# Patient Record
Sex: FEMALE | Race: OTHER | Employment: FULL TIME | URBAN - METROPOLITAN AREA
[De-identification: names, ages, dates, MRNs, and addresses within clinical notes are randomized per-mention and may not be internally consistent; named-entity substitution may affect disease eponyms.]

---

## 2022-11-15 PROBLEM — R20.0 RIGHT FACIAL NUMBNESS: Status: ACTIVE | Noted: 2022-11-15

## 2022-11-15 PROBLEM — I63.9 CEREBROVASCULAR ACCIDENT (CVA), UNSPECIFIED MECHANISM (HCC): Status: ACTIVE | Noted: 2022-11-15

## 2023-08-10 ENCOUNTER — APPOINTMENT (OUTPATIENT)
Dept: GENERAL RADIOLOGY | Age: 63
End: 2023-08-10
Payer: COMMERCIAL

## 2023-08-10 ENCOUNTER — HOSPITAL ENCOUNTER (OUTPATIENT)
Age: 63
Setting detail: OBSERVATION
Discharge: HOME | End: 2023-08-11
Attending: EMERGENCY MEDICINE | Admitting: STUDENT IN AN ORGANIZED HEALTH CARE EDUCATION/TRAINING PROGRAM
Payer: COMMERCIAL

## 2023-08-10 DIAGNOSIS — R07.9 CHEST PAIN, UNSPECIFIED TYPE: Primary | ICD-10-CM

## 2023-08-10 DIAGNOSIS — E16.2 HYPOGLYCEMIA: ICD-10-CM

## 2023-08-10 LAB
ALBUMIN SERPL-MCNC: 4.3 G/DL (ref 3.4–5)
ALBUMIN/GLOB SERPL: 1.4 {RATIO} (ref 1.1–2.2)
ALP SERPL-CCNC: 168 U/L (ref 40–129)
ALT SERPL-CCNC: 43 U/L (ref 10–40)
ANION GAP SERPL CALCULATED.3IONS-SCNC: 11 MMOL/L (ref 3–16)
AST SERPL-CCNC: 33 U/L (ref 15–37)
BACTERIA URNS QL MICRO: ABNORMAL /HPF
BASOPHILS # BLD: 0 K/UL (ref 0–0.2)
BASOPHILS NFR BLD: 1.2 %
BILIRUB SERPL-MCNC: 0.5 MG/DL (ref 0–1)
BILIRUB UR QL STRIP.AUTO: NEGATIVE
BUN SERPL-MCNC: 7 MG/DL (ref 7–20)
CALCIUM SERPL-MCNC: 9.7 MG/DL (ref 8.3–10.6)
CHLORIDE SERPL-SCNC: 105 MMOL/L (ref 99–110)
CLARITY UR: CLEAR
CO2 SERPL-SCNC: 26 MMOL/L (ref 21–32)
COLOR UR: YELLOW
CREAT SERPL-MCNC: 0.7 MG/DL (ref 0.6–1.2)
DEPRECATED RDW RBC AUTO: 13.2 % (ref 12.4–15.4)
EKG ATRIAL RATE: 63 BPM
EKG ATRIAL RATE: 65 BPM
EKG ATRIAL RATE: 68 BPM
EKG DIAGNOSIS: NORMAL
EKG P AXIS: 31 DEGREES
EKG P AXIS: 34 DEGREES
EKG P AXIS: 43 DEGREES
EKG P-R INTERVAL: 160 MS
EKG P-R INTERVAL: 160 MS
EKG P-R INTERVAL: 174 MS
EKG Q-T INTERVAL: 430 MS
EKG Q-T INTERVAL: 432 MS
EKG Q-T INTERVAL: 444 MS
EKG QRS DURATION: 90 MS
EKG QRS DURATION: 94 MS
EKG QRS DURATION: 96 MS
EKG QTC CALCULATION (BAZETT): 449 MS
EKG QTC CALCULATION (BAZETT): 454 MS
EKG QTC CALCULATION (BAZETT): 457 MS
EKG R AXIS: -12 DEGREES
EKG R AXIS: -13 DEGREES
EKG R AXIS: 1 DEGREES
EKG T AXIS: 14 DEGREES
EKG T AXIS: 15 DEGREES
EKG T AXIS: 21 DEGREES
EKG VENTRICULAR RATE: 63 BPM
EKG VENTRICULAR RATE: 65 BPM
EKG VENTRICULAR RATE: 68 BPM
EOSINOPHIL # BLD: 0.3 K/UL (ref 0–0.6)
EOSINOPHIL NFR BLD: 7.2 %
GFR SERPLBLD CREATININE-BSD FMLA CKD-EPI: >60 ML/MIN/{1.73_M2}
GLUCOSE BLD-MCNC: 101 MG/DL (ref 70–99)
GLUCOSE BLD-MCNC: 119 MG/DL (ref 70–99)
GLUCOSE BLD-MCNC: 120 MG/DL (ref 70–99)
GLUCOSE SERPL-MCNC: 123 MG/DL (ref 70–99)
GLUCOSE UR STRIP.AUTO-MCNC: NEGATIVE MG/DL
HCT VFR BLD AUTO: 41.9 % (ref 36–48)
HGB BLD-MCNC: 14.2 G/DL (ref 12–16)
HGB UR QL STRIP.AUTO: NEGATIVE
KETONES UR STRIP.AUTO-MCNC: NEGATIVE MG/DL
LEUKOCYTE ESTERASE UR QL STRIP.AUTO: NEGATIVE
LYMPHOCYTES # BLD: 1.6 K/UL (ref 1–5.1)
LYMPHOCYTES NFR BLD: 39.4 %
MCH RBC QN AUTO: 29.3 PG (ref 26–34)
MCHC RBC AUTO-ENTMCNC: 33.8 G/DL (ref 31–36)
MCV RBC AUTO: 86.6 FL (ref 80–100)
MONOCYTES # BLD: 0.5 K/UL (ref 0–1.3)
MONOCYTES NFR BLD: 11.2 %
NEUTROPHILS # BLD: 1.7 K/UL (ref 1.7–7.7)
NEUTROPHILS NFR BLD: 41 %
NITRITE UR QL STRIP.AUTO: POSITIVE
NT-PROBNP SERPL-MCNC: 87 PG/ML (ref 0–124)
PERFORMED ON: ABNORMAL
PH UR STRIP.AUTO: 7.5 [PH] (ref 5–8)
PLATELET # BLD AUTO: 178 K/UL (ref 135–450)
PMV BLD AUTO: 9 FL (ref 5–10.5)
POTASSIUM SERPL-SCNC: 4.2 MMOL/L (ref 3.5–5.1)
PROT SERPL-MCNC: 7.4 G/DL (ref 6.4–8.2)
PROT UR STRIP.AUTO-MCNC: NEGATIVE MG/DL
RBC # BLD AUTO: 4.84 M/UL (ref 4–5.2)
RBC #/AREA URNS HPF: ABNORMAL /HPF (ref 0–4)
SODIUM SERPL-SCNC: 142 MMOL/L (ref 136–145)
SP GR UR STRIP.AUTO: 1.01 (ref 1–1.03)
TROPONIN, HIGH SENSITIVITY: 7 NG/L (ref 0–14)
TROPONIN, HIGH SENSITIVITY: <6 NG/L (ref 0–14)
UA DIPSTICK W REFLEX MICRO PNL UR: YES
URN SPEC COLLECT METH UR: ABNORMAL
UROBILINOGEN UR STRIP-ACNC: 0.2 E.U./DL
WBC # BLD AUTO: 4.1 K/UL (ref 4–11)
WBC #/AREA URNS HPF: ABNORMAL /HPF (ref 0–5)

## 2023-08-10 PROCEDURE — 99285 EMERGENCY DEPT VISIT HI MDM: CPT

## 2023-08-10 PROCEDURE — 99221 1ST HOSP IP/OBS SF/LOW 40: CPT | Performed by: INTERNAL MEDICINE

## 2023-08-10 PROCEDURE — 81001 URINALYSIS AUTO W/SCOPE: CPT

## 2023-08-10 PROCEDURE — 93005 ELECTROCARDIOGRAM TRACING: CPT | Performed by: STUDENT IN AN ORGANIZED HEALTH CARE EDUCATION/TRAINING PROGRAM

## 2023-08-10 PROCEDURE — G0378 HOSPITAL OBSERVATION PER HR: HCPCS

## 2023-08-10 PROCEDURE — 6370000000 HC RX 637 (ALT 250 FOR IP): Performed by: STUDENT IN AN ORGANIZED HEALTH CARE EDUCATION/TRAINING PROGRAM

## 2023-08-10 PROCEDURE — 80053 COMPREHEN METABOLIC PANEL: CPT

## 2023-08-10 PROCEDURE — 6360000002 HC RX W HCPCS: Performed by: EMERGENCY MEDICINE

## 2023-08-10 PROCEDURE — 71046 X-RAY EXAM CHEST 2 VIEWS: CPT

## 2023-08-10 PROCEDURE — 96365 THER/PROPH/DIAG IV INF INIT: CPT

## 2023-08-10 PROCEDURE — 84484 ASSAY OF TROPONIN QUANT: CPT

## 2023-08-10 PROCEDURE — 36415 COLL VENOUS BLD VENIPUNCTURE: CPT

## 2023-08-10 PROCEDURE — 96375 TX/PRO/DX INJ NEW DRUG ADDON: CPT

## 2023-08-10 PROCEDURE — 85025 COMPLETE CBC W/AUTO DIFF WBC: CPT

## 2023-08-10 PROCEDURE — 96374 THER/PROPH/DIAG INJ IV PUSH: CPT

## 2023-08-10 PROCEDURE — 96372 THER/PROPH/DIAG INJ SC/IM: CPT

## 2023-08-10 PROCEDURE — 83880 ASSAY OF NATRIURETIC PEPTIDE: CPT

## 2023-08-10 PROCEDURE — 93005 ELECTROCARDIOGRAM TRACING: CPT | Performed by: EMERGENCY MEDICINE

## 2023-08-10 PROCEDURE — 2580000003 HC RX 258: Performed by: EMERGENCY MEDICINE

## 2023-08-10 PROCEDURE — 6360000002 HC RX W HCPCS: Performed by: STUDENT IN AN ORGANIZED HEALTH CARE EDUCATION/TRAINING PROGRAM

## 2023-08-10 RX ORDER — HYDROXYZINE PAMOATE 25 MG/1
25 CAPSULE ORAL NIGHTLY PRN
COMMUNITY

## 2023-08-10 RX ORDER — MORPHINE SULFATE 10 MG/ML
6 INJECTION, SOLUTION INTRAMUSCULAR; INTRAVENOUS ONCE
Status: COMPLETED | OUTPATIENT
Start: 2023-08-10 | End: 2023-08-10

## 2023-08-10 RX ORDER — MORPHINE SULFATE 4 MG/ML
4 INJECTION INTRAVENOUS ONCE
Status: DISCONTINUED | OUTPATIENT
Start: 2023-08-10 | End: 2023-08-10

## 2023-08-10 RX ORDER — SODIUM CHLORIDE 0.9 % (FLUSH) 0.9 %
5-40 SYRINGE (ML) INJECTION PRN
Status: DISCONTINUED | OUTPATIENT
Start: 2023-08-10 | End: 2023-08-11 | Stop reason: HOSPADM

## 2023-08-10 RX ORDER — ACETAMINOPHEN 325 MG/1
650 TABLET ORAL EVERY 6 HOURS PRN
Status: DISCONTINUED | OUTPATIENT
Start: 2023-08-10 | End: 2023-08-11 | Stop reason: HOSPADM

## 2023-08-10 RX ORDER — ONDANSETRON 2 MG/ML
4 INJECTION INTRAMUSCULAR; INTRAVENOUS EVERY 6 HOURS PRN
Status: DISCONTINUED | OUTPATIENT
Start: 2023-08-10 | End: 2023-08-11 | Stop reason: HOSPADM

## 2023-08-10 RX ORDER — POLYETHYLENE GLYCOL 3350 17 G/17G
17 POWDER, FOR SOLUTION ORAL DAILY PRN
Status: DISCONTINUED | OUTPATIENT
Start: 2023-08-10 | End: 2023-08-11 | Stop reason: HOSPADM

## 2023-08-10 RX ORDER — ONDANSETRON 4 MG/1
4 TABLET, ORALLY DISINTEGRATING ORAL EVERY 8 HOURS PRN
Status: DISCONTINUED | OUTPATIENT
Start: 2023-08-10 | End: 2023-08-11 | Stop reason: HOSPADM

## 2023-08-10 RX ORDER — ATORVASTATIN CALCIUM 40 MG/1
40 TABLET, FILM COATED ORAL NIGHTLY
Status: DISCONTINUED | OUTPATIENT
Start: 2023-08-10 | End: 2023-08-11 | Stop reason: HOSPADM

## 2023-08-10 RX ORDER — REGADENOSON 0.08 MG/ML
0.4 INJECTION, SOLUTION INTRAVENOUS
Status: COMPLETED | OUTPATIENT
Start: 2023-08-10 | End: 2023-08-11

## 2023-08-10 RX ORDER — SODIUM CHLORIDE 9 MG/ML
INJECTION, SOLUTION INTRAVENOUS PRN
Status: DISCONTINUED | OUTPATIENT
Start: 2023-08-10 | End: 2023-08-11 | Stop reason: HOSPADM

## 2023-08-10 RX ORDER — ENOXAPARIN SODIUM 100 MG/ML
40 INJECTION SUBCUTANEOUS DAILY
Status: DISCONTINUED | OUTPATIENT
Start: 2023-08-10 | End: 2023-08-11 | Stop reason: HOSPADM

## 2023-08-10 RX ORDER — SODIUM CHLORIDE 0.9 % (FLUSH) 0.9 %
5-40 SYRINGE (ML) INJECTION EVERY 12 HOURS SCHEDULED
Status: DISCONTINUED | OUTPATIENT
Start: 2023-08-10 | End: 2023-08-11 | Stop reason: HOSPADM

## 2023-08-10 RX ORDER — DEXTROSE MONOHYDRATE 100 MG/ML
INJECTION, SOLUTION INTRAVENOUS CONTINUOUS PRN
Status: DISCONTINUED | OUTPATIENT
Start: 2023-08-10 | End: 2023-08-11 | Stop reason: HOSPADM

## 2023-08-10 RX ORDER — ASPIRIN 81 MG/1
81 TABLET, CHEWABLE ORAL DAILY
Status: DISCONTINUED | OUTPATIENT
Start: 2023-08-11 | End: 2023-08-11 | Stop reason: HOSPADM

## 2023-08-10 RX ORDER — ACETAMINOPHEN 650 MG/1
650 SUPPOSITORY RECTAL EVERY 6 HOURS PRN
Status: DISCONTINUED | OUTPATIENT
Start: 2023-08-10 | End: 2023-08-11 | Stop reason: HOSPADM

## 2023-08-10 RX ADMIN — ATORVASTATIN CALCIUM 40 MG: 40 TABLET, FILM COATED ORAL at 20:35

## 2023-08-10 RX ADMIN — ENOXAPARIN SODIUM 40 MG: 100 INJECTION SUBCUTANEOUS at 14:57

## 2023-08-10 RX ADMIN — CEFTRIAXONE 1000 MG: 1 INJECTION, POWDER, FOR SOLUTION INTRAMUSCULAR; INTRAVENOUS at 11:42

## 2023-08-10 RX ADMIN — MORPHINE SULFATE 6 MG: 10 INJECTION, SOLUTION INTRAMUSCULAR; INTRAVENOUS at 08:57

## 2023-08-10 ASSESSMENT — PAIN SCALES - GENERAL
PAINLEVEL_OUTOF10: 0
PAINLEVEL_OUTOF10: 4
PAINLEVEL_OUTOF10: 5
PAINLEVEL_OUTOF10: 0

## 2023-08-10 ASSESSMENT — PAIN DESCRIPTION - ORIENTATION: ORIENTATION: LEFT

## 2023-08-10 ASSESSMENT — PAIN DESCRIPTION - LOCATION: LOCATION: CHEST

## 2023-08-10 ASSESSMENT — PAIN - FUNCTIONAL ASSESSMENT: PAIN_FUNCTIONAL_ASSESSMENT: 0-10

## 2023-08-10 NOTE — H&P
V2.0  History and Physical      Name:  Caity Trejo /Age/Sex: 1960  (59 y.o. female)   MRN & CSN:  [id number] & [id number] Encounter Date/Time: 8/10/2023 11:47 AM EDT   [facility] PCP: Jasson Chou MD       Hospital Day: 1    Assessment and Plan:   Caity Trejo is a 59 y.o. female with a pmh of Type II DM who presents with Chest pain    #Chest Pain, appears atypical but has cardiac risk factors and no prior ischemic evaluation  - Admit to hospital  - Telemetry  - ASA and statin  - Consulting cardiology    #Type II DM, non insulin-dependent, with hypoglycemia now improving  - Suspect related to metformin use and acute illness  - Hold metformin for now  - monitor BG w/ hypoglycemia protocol  - start insulin inpatient if becomes hyperglycemic    #UTI  - Rocephin  - Obtain urine culture    Disposition:   Current Living situation: Home  Expected Disposition: Home  Estimated D/C: 2023    Diet Diet NPO  ADULT DIET; Regular; No Caffeine   DVT Prophylaxis [] Lovenox, []  Heparin, [] SCDs, [] Ambulation,  [] Eliquis, [] Xarelto, [] Coumadin   Code Status Full Code   Surrogate Decision Maker/ POA Sidra Pencil, child     Personally reviewed Lab Studies and Imaging     Discussed management of the case with Ed physician and agree with plan for admission     EKG interpreted personally and results NSR no acute ST changes    Imaging that was interpreted personally includes CXR and results no acute abnormality      History from:     patient    History of Present Illness:     Chief Complaint: Chest Pain  Caity Trejo is a 59 y.o. female with pmh of Type II DM who presented today for evaluation of chest pain and low blood sugar. Patient reports in her usual state of health until yesterday evening when she began experiencing left-side chest pain as well as left arm pain. Mild-moderate in severity. She checked her blood sugar after she began feeling the pain and noted it was low ~50s.  Drank some fruit Past Medical History:   PMHx   Past Medical History:   Diagnosis Date    Diabetes mellitus ([address])     GERD (gastroesophageal reflux disease)      PSHX:  has no past surgical history on file. Allergies: No Known Allergies  Fam HX:  family history is not on file. Soc HX:   Social History     Socioeconomic History    Marital status:      Spouse name: None    Number of children: None    Years of education: None    Highest education level: None   Tobacco Use    Smoking status: Never    Smokeless tobacco: Never   Substance and Sexual Activity    Alcohol use: Never    Drug use: Never       Medications:   Medications:    cefTRIAXone (ROCEPHIN) IV  1,000 mg IntraVENous Once    sodium chloride flush  5-40 mL IntraVENous 2 times per day    [START ON 8/11/2023] aspirin  81 mg Oral Daily    atorvastatin  40 mg Oral Nightly    enoxaparin  40 mg SubCUTAneous Daily      Infusions:    sodium chloride       PRN Meds: sodium chloride flush, 5-40 mL, PRN  sodium chloride, , PRN  ondansetron, 4 mg, Q8H PRN   Or  ondansetron, 4 mg, Q6H PRN  acetaminophen, 650 mg, Q6H PRN   Or  acetaminophen, 650 mg, Q6H PRN  polyethylene glycol, 17 g, Daily PRN        Labs      CBC:   Recent Labs     08/10/23  0816   WBC 4.1   HGB 14.2        BMP:    Recent Labs     08/10/23  0816      K 4.2      CO2 26   BUN 7   CREATININE 0.7   GLUCOSE 123*     Hepatic:   Recent Labs     08/10/23  0816   AST 33   ALT 43*   BILITOT 0.5   ALKPHOS 168*     Lipids:   Lab Results   Component Value Date/Time    CHOL 134 11/15/2022 07:38 AM    HDL 48 11/15/2022 07:38 AM    TRIG 92 11/15/2022 07:38 AM     Hemoglobin A1C:   Lab Results   Component Value Date/Time    LABA1C 5.9 11/15/2022 07:38 AM     TSH: No results found for: TSH  Troponin: No results found for: TROPONINT  Lactic Acid: No results for input(s): LACTA in the last 72 hours.   BNP:   Recent Labs     08/10/23  0816   PROBNP 87     UA:  Lab Results   Component Value Date/Time

## 2023-08-10 NOTE — CONSULTS
Reason for Consultation/Chief Complaint:   History of Present Illness:  Madelaine Gould is a 59 y.o. patient whom we were asked to see for chest pain. Night prior to presenting began having chest pain. Pain left sided and with left arm discomfort. Noted BS was low. This am still had discomfort. Discomfort was mild-moderate. Presents to ER. Trop negative. EKG unremarkable. Past Medical History:   has a past medical history of Diabetes mellitus ([address]) and GERD (gastroesophageal reflux disease). Surgical History:   has no past surgical history on file. Social History:   reports that she has never smoked. She has never used smokeless tobacco. She reports that she does not drink alcohol and does not use drugs. Family History:  No evidence for sudden cardiac death or premature CAD    Home Medications:  Were reviewed and are listed in nursing record. and/or listed below  Prior to Admission medications    Medication Sig Start Date End Date Taking? Authorizing Provider   hydrOXYzine pamoate (VISTARIL) 25 MG capsule Take 1 capsule by mouth nightly as needed    Historical Provider, MD   pantoprazole (PROTONIX) 20 MG tablet Take 2 tablets by mouth daily    Historical Provider, MD   Cholecalciferol (VITAMIN D) 50 MCG (2000 UT) CAPS capsule Take by mouth Pt states she takes 5,000 MG daily    Historical Provider, MD   aspirin 81 MG EC tablet Take 1 tablet by mouth daily    Historical Provider, MD   atorvastatin (LIPITOR) 40 MG tablet Take 0.5 tablets by mouth daily Pt states takes 10mg    Historical Provider, MD   metFORMIN (GLUCOPHAGE) 500 MG tablet Take 1 tablet by mouth 2 times daily (with meals)    Historical Provider, MD        Allergies:  Patient has no known allergies. Review of Systems:     Constitutional: there has been no unanticipated weight loss. There's been no change in energy level, sleep pattern, or activity level. Eyes: No visual changes or diplopia. No scleral icterus.   ENT: No 08:16 AM    CO2 26 08/10/2023 08:16 AM    BUN 7 08/10/2023 08:16 AM    CREATININE 0.7 08/10/2023 08:16 AM    AGRATIO 1.4 08/10/2023 08:16 AM    LABGLOM >60 08/10/2023 08:16 AM    GLUCOSE 123 08/10/2023 08:16 AM    PROT 7.4 08/10/2023 08:16 AM    CALCIUM 9.7 08/10/2023 08:16 AM    BILITOT 0.5 08/10/2023 08:16 AM    ALKPHOS 168 08/10/2023 08:16 AM    AST 33 08/10/2023 08:16 AM    ALT 43 08/10/2023 08:16 AM     PT/INR:  No results found for: PTINR  Lab Results   Component Value Date    TROPONINI <0.01 11/14/2022       EKG:  I have reviewed EKG with the following interpretation:  Impression:  NSR, WNL    Assessment  Patient Active Problem List   Diagnosis    Amnesia    Right facial numbness    Chest pain         Plan:    Chest pain with atypical features. Negative trop. Has risk factor of DM/lipids. High risk for CAD. Risk stratify with shaista.

## 2023-08-10 NOTE — PROGRESS NOTES
Patient arrived from the ED via stretcher, daughter at bedside. VSS, BS, 101,afebrile, no c/o. Educate on n safety issue and reorient to the unit. Call bell within reach, bed alarm on, side rails up. Personal belongings at bedside ( clothes, shoes, bag). Patient alert and oriented x 4. Continue to monitor.

## 2023-08-10 NOTE — ED PROVIDER NOTES
[facility]          ATTENDING PHYSICIAN NOTE       Date of evaluation: 8/10/2023      Assessment/ Medical Decion Making     MDM:   ED Course as of 08/10/23 1624   Thu Aug 10, 2023   12 59-year-old female who presents for evaluation of chest pain. EKG interpreted by me with sinus rhythm, leftward axis, normal intervals, T wave inversion in lead III, otherwise no ST or T wave changes. Overall similar in appearance to my review of EKG performed in November 2022 with the exception of slight worsening in R wave progression. [MM]      ED Course User Index  [MM] Chikis Vega MD     Patient given analgesics here. On my reassessment following this, pain is improved though not resolved. UA with evidence of UTI which may be the cause of her urinary frequency. This also may be the cause of her hypoglycemia. Laboratory evaluation otherwise reassuring including negative troponins. With recurrent hypoglycemia, UTI, chest pain, will admit for IV antibiotics to see if her blood glucose normalizes with treatment. Also think she benefit from cardiac workup given ongoing pain and no prior history with risk factors. Medical Decision Making  Amount and/or Complexity of Data Reviewed  Labs: ordered. Radiology: ordered. ECG/medicine tests: ordered. Risk  Prescription drug management. Decision regarding hospitalization. Emilia Bran MD  4:24 PM    Clinical Impression     1. Chest pain, unspecified type    2. Hypoglycemia        Disposition     DISPOSITION Admitted 08/10/2023 11:46:54 AM        Chief Complaint     Chest Pain (L upper chest, started about 10 pm last night and was worse this morning)      History of Present Illness     Leslie Chapin is a 59 y.o. female with past medical history of diabetes who presents for evaluation of chest pain. States that when she checked her sugar last night it was 52. Rechecked and it was 39.  Drank sugary drinks and it physician. Confirmed by MD, ER ([id number]),  Tana Cardenas ([id number]) on 8/10/2023 8:40:29 AM   EKG 12 lead - Serial   Result Value Ref Range    Ventricular Rate 63 BPM    Atrial Rate 63 BPM    P-R Interval 160 ms    QRS Duration 94 ms    Q-T Interval 444 ms    QTc Calculation (Bazett) 454 ms    P Axis 31 degrees    R Axis 1 degrees    T Axis 21 degrees    Diagnosis       EKG performed in ER and to be interpreted by ER physician. Confirmed by MD, ER ([id number]),  Alfreda Navarroh ([id number]) on 8/10/2023 12:54:18 PM   EKG 12 lead - Serial   Result Value Ref Range    Ventricular Rate 65 BPM    Atrial Rate 65 BPM    P-R Interval 174 ms    QRS Duration 90 ms    Q-T Interval 432 ms    QTc Calculation (Bazett) 449 ms    P Axis 34 degrees    R Axis -12 degrees    T Axis 15 degrees    Diagnosis       EKG performed in ER and to be interpreted by ER physician. Confirmed by MD, ER ([id number]),  15 Liam Sarmiento, [facility] ([id number]) on 8/10/2023 2:17:24 PM       CONSULTS:  [id number] [facility] TO:  No follow-up provider specified.     DISCHARGE MEDICATIONS:  New Prescriptions    No medications on file          Alejandro Roberts MD  08/10/23 1808

## 2023-08-10 NOTE — PROGRESS NOTES
4 Eyes Skin Assessment     NAME:  Thierno Sullivan  YOB: 1960  MEDICAL RECORD NUMBER:  [id number]    The patient is being assessed for  Admission    I agree that at least one RN has performed a thorough Head to Toe Skin Assessment on the patient. ALL assessment sites listed below have been assessed. Areas assessed by both nurses:    Head, Face, Ears, Shoulders, Back, Chest, Arms, Elbows, Hands, Sacrum. Buttock, Coccyx, Ischium, Legs. Feet and Heels, and Under Medical Devices         Does the Patient have a Wound?  No noted wound(s)       Buddy Prevention initiated by RN: Yes  Wound Care Orders initiated by RN: No    Pressure Injury (Stage 3,4, Unstageable, DTI, NWPT, and Complex wounds) if present, place Wound referral order by RN under : NO    New Ostomies, if present place, Ostomy referral order under : No     Nurse 1 eSignature: Electronically signed by Jerry Brownlee RN on 8/10/23 at 7:07 PM EDT    **SHARE this note so that the co-signing nurse can place an eSignature**    Nurse 2 eSignature: Electronically signed by Tiffanie Son RN on 8/10/23 at 7:18 PM EDT

## 2023-08-10 NOTE — PROGRESS NOTES
Report given to [facility] RN. Answered all questions and concerns. Pt to be transported via stretcher.

## 2023-08-11 VITALS
BODY MASS INDEX: 38.09 KG/M2 | HEART RATE: 63 BPM | DIASTOLIC BLOOD PRESSURE: 72 MMHG | SYSTOLIC BLOOD PRESSURE: 135 MMHG | HEIGHT: 63 IN | WEIGHT: 215 LBS | OXYGEN SATURATION: 97 % | TEMPERATURE: 96.5 F | RESPIRATION RATE: 17 BRPM

## 2023-08-11 LAB
ALBUMIN SERPL-MCNC: 3.9 G/DL (ref 3.4–5)
ALBUMIN/GLOB SERPL: 1.3 {RATIO} (ref 1.1–2.2)
ALP SERPL-CCNC: 143 U/L (ref 40–129)
ALT SERPL-CCNC: 40 U/L (ref 10–40)
ANION GAP SERPL CALCULATED.3IONS-SCNC: 12 MMOL/L (ref 3–16)
AST SERPL-CCNC: 36 U/L (ref 15–37)
BILIRUB SERPL-MCNC: 0.5 MG/DL (ref 0–1)
BUN SERPL-MCNC: 10 MG/DL (ref 7–20)
CALCIUM SERPL-MCNC: 9.3 MG/DL (ref 8.3–10.6)
CHLORIDE SERPL-SCNC: 102 MMOL/L (ref 99–110)
CHOLEST SERPL-MCNC: 131 MG/DL (ref 0–199)
CO2 SERPL-SCNC: 24 MMOL/L (ref 21–32)
CREAT SERPL-MCNC: 0.7 MG/DL (ref 0.6–1.2)
DEPRECATED RDW RBC AUTO: 13.6 % (ref 12.4–15.4)
GFR SERPLBLD CREATININE-BSD FMLA CKD-EPI: >60 ML/MIN/{1.73_M2}
GLUCOSE BLD-MCNC: 96 MG/DL (ref 70–99)
GLUCOSE SERPL-MCNC: 108 MG/DL (ref 70–99)
HCT VFR BLD AUTO: 42.9 % (ref 36–48)
HDLC SERPL-MCNC: 37 MG/DL (ref 40–60)
HGB BLD-MCNC: 14.4 G/DL (ref 12–16)
LDLC SERPL CALC-MCNC: 59 MG/DL
LV EF: 74 %
LVEF MODALITY: NORMAL
MCH RBC QN AUTO: 29.5 PG (ref 26–34)
MCHC RBC AUTO-ENTMCNC: 33.6 G/DL (ref 31–36)
MCV RBC AUTO: 87.8 FL (ref 80–100)
PERFORMED ON: NORMAL
PLATELET # BLD AUTO: 169 K/UL (ref 135–450)
PMV BLD AUTO: 9.9 FL (ref 5–10.5)
POTASSIUM SERPL-SCNC: 4.3 MMOL/L (ref 3.5–5.1)
PROT SERPL-MCNC: 7 G/DL (ref 6.4–8.2)
RBC # BLD AUTO: 4.89 M/UL (ref 4–5.2)
SODIUM SERPL-SCNC: 138 MMOL/L (ref 136–145)
TRIGL SERPL-MCNC: 174 MG/DL (ref 0–150)
VLDLC SERPL CALC-MCNC: 35 MG/DL
WBC # BLD AUTO: 4.5 K/UL (ref 4–11)

## 2023-08-11 PROCEDURE — 36415 COLL VENOUS BLD VENIPUNCTURE: CPT

## 2023-08-11 PROCEDURE — G0378 HOSPITAL OBSERVATION PER HR: HCPCS

## 2023-08-11 PROCEDURE — 80053 COMPREHEN METABOLIC PANEL: CPT

## 2023-08-11 PROCEDURE — A9502 TC99M TETROFOSMIN: HCPCS | Performed by: STUDENT IN AN ORGANIZED HEALTH CARE EDUCATION/TRAINING PROGRAM

## 2023-08-11 PROCEDURE — 6360000002 HC RX W HCPCS: Performed by: STUDENT IN AN ORGANIZED HEALTH CARE EDUCATION/TRAINING PROGRAM

## 2023-08-11 PROCEDURE — 80061 LIPID PANEL: CPT

## 2023-08-11 PROCEDURE — 78452 HT MUSCLE IMAGE SPECT MULT: CPT

## 2023-08-11 PROCEDURE — 83036 HEMOGLOBIN GLYCOSYLATED A1C: CPT

## 2023-08-11 PROCEDURE — 99232 SBSQ HOSP IP/OBS MODERATE 35: CPT | Performed by: INTERNAL MEDICINE

## 2023-08-11 PROCEDURE — 3430000000 HC RX DIAGNOSTIC RADIOPHARMACEUTICAL: Performed by: STUDENT IN AN ORGANIZED HEALTH CARE EDUCATION/TRAINING PROGRAM

## 2023-08-11 PROCEDURE — 85027 COMPLETE CBC AUTOMATED: CPT

## 2023-08-11 PROCEDURE — 2580000003 HC RX 258: Performed by: STUDENT IN AN ORGANIZED HEALTH CARE EDUCATION/TRAINING PROGRAM

## 2023-08-11 PROCEDURE — 93017 CV STRESS TEST TRACING ONLY: CPT

## 2023-08-11 RX ORDER — SULFAMETHOXAZOLE AND TRIMETHOPRIM 800; 160 MG/1; MG/1
1 TABLET ORAL EVERY 12 HOURS SCHEDULED
Status: DISCONTINUED | OUTPATIENT
Start: 2023-08-11 | End: 2023-08-11 | Stop reason: HOSPADM

## 2023-08-11 RX ORDER — SULFAMETHOXAZOLE AND TRIMETHOPRIM 800; 160 MG/1; MG/1
1 TABLET ORAL EVERY 12 HOURS SCHEDULED
Qty: 4 TABLET | Refills: 0 | Status: SHIPPED | OUTPATIENT
Start: 2023-08-11 | End: 2023-08-13

## 2023-08-11 RX ADMIN — TETROFOSMIN 30 MILLICURIE: 1.38 INJECTION, POWDER, LYOPHILIZED, FOR SOLUTION INTRAVENOUS at 10:18

## 2023-08-11 RX ADMIN — CEFTRIAXONE 1000 MG: 1 INJECTION, POWDER, FOR SOLUTION INTRAMUSCULAR; INTRAVENOUS at 12:59

## 2023-08-11 RX ADMIN — REGADENOSON 0.4 MG: 0.08 INJECTION, SOLUTION INTRAVENOUS at 10:18

## 2023-08-11 RX ADMIN — TETROFOSMIN 10 MILLICURIE: 1.38 INJECTION, POWDER, LYOPHILIZED, FOR SOLUTION INTRAVENOUS at 08:40

## 2023-08-11 ASSESSMENT — PAIN SCALES - GENERAL
PAINLEVEL_OUTOF10: 0
PAINLEVEL_OUTOF10: 0

## 2023-08-11 NOTE — PLAN OF CARE
Problem: Cardiovascular - Adult  Goal: Maintains optimal cardiac output and hemodynamic stability  Outcome: Progressing   Pt denies chest pain at this time. No further c/o at this time. Problem: Pain  Goal: Verbalizes/displays adequate comfort level or baseline comfort level  Outcome: Progressing   Pt denies pain at this time, RN encouraged pt to call out if needed anything. Will continue to assess pain level throughout shift.

## 2023-08-11 NOTE — CARE COORDINATION
Review of chart for any potential discharge needs. Pt has insurance, a PCP and an employer on the facesheet. 97% on room air per vitals in epic    No needs identified for discharge intervention at this time. MD and bedside RN  if needs arise please consult case management for discharge intervention. CM not following at this time. PRASHANT Alvarez   for [facility] ([address] [place] [address])  Office Phone: [phone] [address]: [phone]    Addendum at 3:37pm: Discharge order noted. SW not following. No HHC or DME orders entered in epic.  Remains on room air per vitals in epic

## 2023-08-11 NOTE — PROGRESS NOTES
Patient left the unit for stress test.    VSS, no c/o, alert and oriented x 4. Will cont. to monitor.

## 2023-08-11 NOTE — DISCHARGE SUMMARY
V2.0  Discharge Summary    Name:  Micheal Newsome /Age/Sex: 1960 (59 y.o. female)   Admit Date: 8/10/2023  Discharge Date: 23    MRN & CSN:  [id number] & [id number] Encounter Date and Time 23 4:30 PM EDT    Attending:  Pancho Bentley MD Discharging Provider: Pancho Bentley MD       Hospital Course:     Nani Vazquez is a 59 y.o. female with pmh of Type II DM who presented for evaluation of chest pain and low blood sugar. Was in her usual state of health until prior evening when she began experiencing left-side chest pain as well as left arm pain. Mild-moderate in severity. She checked her blood sugar after she began feeling the pain and noted it was low ~50s. Drank some fruit jucie and it improved some but started dropping again a few hours later so she drank some more juice. This am still had the pain and her BG was in the 60s on awakening so she came to ED for evaluation. Only other symptoms she endorsed was urinary frequency for past 1-2 days without dysuria. In the ED vitals stable. BG stable in 110s. Troponin and EKG wnl. UA suggestive of possible UTI Admitted for further evaluation. Started on Bactrim for empiric UTI treatment. BG monitored and stable without intervention overnight. Cardiology consulted and recommended stress test which was completed and low-risk. Patient subsequently discharged home. The patient expressed appropriate understanding of, and agreement with the discharge recommendations, medications, and plan.      Consults this admission:  IP CONSULT TO CARDIOLOGY    Discharge Diagnosis:   Chest pain  Type II DM  UTI    Discharge Instruction:   Follow up appointments: PCP  Primary care physician: Traci aLkhani MD within 2 weeks  Diet: diabetic diet   Activity: activity as tolerated  Disposition: Discharged to:   [x]Home, []HHC, []SNF, []Acute Rehab, []Hospice   Condition on discharge: Stable  Labs and Tests to be Followed up as an outpatient by PCP or Specialist: SABINA DENNY Negative 08/10/2023 08:36 AM     Urine Cultures: No results found for: LABURIN  Blood Cultures: No results found for: BC  No results found for: BLOODCULT2  Organism: No results found for: ORG    Time Spent Discharging patient 33 minutes    Electronically signed by Sriram Newton MD on 8/11/2023 at 4:30 PM

## 2023-08-11 NOTE — PROGRESS NOTES
Patient left the unit via wheelchair with the family, assisted by PCA to the lobby. VSS, afebrile no c/o when left the room. Personal belongings, discharge papers, instructions and follow up explained and discussed with the patient and family, and verbalized understandings.     Left hand peripheral IV, removed, c/d/I.

## 2023-08-11 NOTE — PROGRESS NOTES
Pt alert and oriented x4, VSS, IV ns locked in left hand. Pt denies chest pain and dizziness. Pt ambulates with a steady gait. NPO at midnight. Bed alarm on, bedside table and call light in reach.

## 2023-08-11 NOTE — PROGRESS NOTES
Patient back from stress test around 1245 . VSS, no c/o  pain,  BS 96, afebrile. Educate patient that she will stay NPO until stress test results back and verbalized understandings. Call bell within reach, bed alarm on.

## 2023-08-12 LAB
EST. AVERAGE GLUCOSE BLD GHB EST-MCNC: 122.6 MG/DL
HBA1C MFR BLD: 5.9 %